# Patient Record
Sex: FEMALE | Race: WHITE | NOT HISPANIC OR LATINO | Employment: OTHER | URBAN - METROPOLITAN AREA
[De-identification: names, ages, dates, MRNs, and addresses within clinical notes are randomized per-mention and may not be internally consistent; named-entity substitution may affect disease eponyms.]

---
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
## 2024-05-13 ENCOUNTER — TELEPHONE (OUTPATIENT)
Dept: SPORTS MEDICINE | Facility: CLINIC | Age: 19
End: 2024-05-13
Payer: OTHER GOVERNMENT

## 2024-05-13 NOTE — TELEPHONE ENCOUNTER
Spoke with pt, pt had questions about an incomplete referral she received from [facility]. Pt will call PCP to get everything corrected

## 2024-05-13 NOTE — TELEPHONE ENCOUNTER
----- Message from Rad Zaldivar MA sent at 5/13/2024 10:00 AM CDT -----  Regarding: referral status  Contact: pt at [phone]  Pt is calling to speak with someone in provider office is asking for a return call to check the status of her  referral  please call pt at [phone] faxed on May 8th

## 2024-05-16 ENCOUNTER — TELEPHONE (OUTPATIENT)
Dept: SPORTS MEDICINE | Facility: CLINIC | Age: 19
End: 2024-05-16
Payer: OTHER GOVERNMENT

## 2024-05-16 NOTE — TELEPHONE ENCOUNTER
----- Message from Ann Conteh sent at 5/16/2024  9:26 AM CDT -----  Who Called: Pt    What is the request in detail: Requesting call back to discuss scheduling appt from referral, pt has referral. Please advise    Can the clinic reply by [facility]? No    Best Call Back Number: [phone]      Additional Information:

## 2024-10-29 ENCOUNTER — HOSPITAL ENCOUNTER (OUTPATIENT)
Facility: HOSPITAL | Age: 19
Discharge: HOME | End: 2024-10-29
Attending: OBSTETRICS & GYNECOLOGY | Admitting: OBSTETRICS & GYNECOLOGY
Payer: OTHER GOVERNMENT

## 2024-10-29 VITALS
OXYGEN SATURATION: 99 % | BODY MASS INDEX: 25.46 KG/M2 | SYSTOLIC BLOOD PRESSURE: 122 MMHG | WEIGHT: 168 LBS | HEIGHT: 68 IN | HEART RATE: 86 BPM | RESPIRATION RATE: 20 BRPM | DIASTOLIC BLOOD PRESSURE: 68 MMHG | TEMPERATURE: 98 F

## 2024-10-29 DIAGNOSIS — O36.8190 DECREASED FETAL MOVEMENT: ICD-10-CM

## 2024-10-29 PROCEDURE — 99211 OFF/OP EST MAY X REQ PHY/QHP: CPT | Mod: 25

## 2024-10-29 PROCEDURE — 59025 FETAL NON-STRESS TEST: CPT

## 2024-10-30 NOTE — NURSING
2240: Patient came with c/o decreased fetal movement. Denies of contraction, vaginal bleeding and loss of fluid. FHT from doppler 144.

## 2024-10-30 NOTE — NURSING
2339: Called Dr. Chandra regarding patient's status. Md ordered to discharge patient with discharge instruction.

## 2024-10-30 NOTE — NURSING
NST reactive and reassuring.     Provided patient discharge instruction. Questions answered. Patient verbalized understanding.

## 2024-10-30 NOTE — DISCHARGE INSTRUCTIONS
Home Undelivered Discharge Instructions    After Discharge Orders:      Current Discharge Medication List        CONTINUE these medications which have NOT CHANGED    Details   ondansetron (ZOFRAN-ODT) 4 MG TbDL Take 1 tablet (4 mg total) by mouth every 8 (eight) hours as needed.  Qty: 20 tablet, Refills: 0                     Diet:  normal diet as tolerated    Rest: normal activity as tolerated    Other instructions: Do kick counts once a day on your baby. Choose the time of day your baby is most active. Get in a comfortable lying or sitting position and time how long it takes to feel 10 kicks, twists, turns, swishes, or rolls. Call your physician or midwife if there have not been 10 kicks in 1 hours    Call physician or midwife, return to Labor and Delivery, call 911, or go to the nearest Emergency Room if: increased leakage or fluid, contractions more than  5 per  15 minutes, decreased fetal movement, persistent low back pain or cramping, bleeding from vaginal area, difficulty urinating, pain with urination, difficulty breathing, new calf pain, persistent headache, or vision change